# Patient Record
Sex: MALE | Race: OTHER | HISPANIC OR LATINO
[De-identification: names, ages, dates, MRNs, and addresses within clinical notes are randomized per-mention and may not be internally consistent; named-entity substitution may affect disease eponyms.]

---

## 2024-01-28 ENCOUNTER — NON-APPOINTMENT (OUTPATIENT)
Age: 34
End: 2024-01-28

## 2024-02-14 PROBLEM — Z00.00 ENCOUNTER FOR PREVENTIVE HEALTH EXAMINATION: Status: ACTIVE | Noted: 2024-02-14

## 2024-02-21 ENCOUNTER — APPOINTMENT (OUTPATIENT)
Dept: ORTHOPEDIC SURGERY | Facility: CLINIC | Age: 34
End: 2024-02-21
Payer: OTHER MISCELLANEOUS

## 2024-02-21 ENCOUNTER — NON-APPOINTMENT (OUTPATIENT)
Age: 34
End: 2024-02-21

## 2024-02-21 DIAGNOSIS — M54.16 RADICULOPATHY, LUMBAR REGION: ICD-10-CM

## 2024-02-21 PROCEDURE — 99203 OFFICE O/P NEW LOW 30 MIN: CPT

## 2024-02-21 NOTE — DISCUSSION/SUMMARY
[de-identified] : 34-year-old male presents with symptoms of resolving lumbar radiculopathy.  The patient doing much better.  Given prescription for physical therapy I will keep him out of work until March 1 and then she will resume to work full duty.  Follow-up as needed.

## 2024-02-21 NOTE — IMAGING
[de-identified] : TTP midline spine and paraspinal musculature  Strength                                          Hip flexor   Right: 5/5; Left: 5/5                              Knee extensor     Right: 5/5; Left: 5/5                      Ankle dorsiflexion   Right: 5/5; Left: 5/5                   EHL           Right: 5/5; Left: 5/5                                 Ankle plantarflexion       Right: 5/5; Left: 5/5  Sensation L1   Right: 2/2; Left: 2/2 L2   Right: 2/2; Left: 2/2 L3   Right: 2/2; Left: 2/2 L4   Right: 2/2; Left: 2/2 L5   Right: 2/2; Left: 2/2 S1   Right: 2/2; Left: 2/2  Reflexes Patella   Right: 2+; Left 2+ Achilles   Right: 2+; Left 2+ Clonus  Right: absent; L: absent

## 2024-02-21 NOTE — HISTORY OF PRESENT ILLNESS
[de-identified] : 34-year-old male presents to me with low back pain.  Towards the right side.  Of note the patient had a work-related injury in January.  He went to urgent care.  Was given some medication. He is feeling much better he saw some lingering pain on the right side of his low back however he no longer has any radiating pain down his right leg.  He has not done physical therapy.  He thinks he will be ready to go back to work on March 1.

## 2024-03-20 ENCOUNTER — NON-APPOINTMENT (OUTPATIENT)
Age: 34
End: 2024-03-20

## 2025-02-24 ENCOUNTER — APPOINTMENT (OUTPATIENT)
Age: 35
End: 2025-02-24